# Patient Record
Sex: FEMALE | Race: BLACK OR AFRICAN AMERICAN | NOT HISPANIC OR LATINO | Employment: PART TIME | URBAN - METROPOLITAN AREA
[De-identification: names, ages, dates, MRNs, and addresses within clinical notes are randomized per-mention and may not be internally consistent; named-entity substitution may affect disease eponyms.]

---

## 2018-06-13 ENCOUNTER — TELEPHONE (OUTPATIENT)
Dept: VASCULAR SURGERY | Facility: CLINIC | Age: 68
End: 2018-06-13

## 2018-06-13 DIAGNOSIS — R60.0 LOCALIZED EDEMA: Primary | ICD-10-CM

## 2018-06-13 NOTE — TELEPHONE ENCOUNTER
----- Message from Kaveh Garza sent at 6/13/2018 12:57 PM CDT -----  Calling for a new patient appointment preferably [place], but can come to [place]. Patient was in hospital for ruptured varicose vein. Her appointment should be within two weeks. Please call [phone] (home) [phone] (work)  thanks

## 2018-06-18 NOTE — TELEPHONE ENCOUNTER
Per ED visit @ [facility] on 6/12/18, ruptured varicose vein, follow up with Dr Costa.  Informed of Dr Costa's ultrasound guidelines for venous symptoms.  Ultrasound of deep and superficial veins BLE ordered and scheduled per WOGS, and consultation also scheduled with Dr Costa.     Voices understanding, will call back as needed.

## 2018-07-02 ENCOUNTER — HOSPITAL ENCOUNTER (OUTPATIENT)
Dept: RADIOLOGY | Facility: HOSPITAL | Age: 68
Discharge: HOME | End: 2018-07-02
Attending: THORACIC SURGERY (CARDIOTHORACIC VASCULAR SURGERY)
Payer: COMMERCIAL

## 2018-07-02 DIAGNOSIS — R60.0 LOCALIZED EDEMA: ICD-10-CM

## 2018-07-02 PROCEDURE — 93970 EXTREMITY STUDY: CPT | Mod: 26,,, | Performed by: RADIOLOGY

## 2018-07-02 PROCEDURE — 93970 EXTREMITY STUDY: CPT | Mod: TC,PO

## 2018-07-05 ENCOUNTER — OFFICE VISIT (OUTPATIENT)
Dept: VASCULAR SURGERY | Facility: CLINIC | Age: 68
End: 2018-07-05
Payer: COMMERCIAL

## 2018-07-05 VITALS
WEIGHT: 189.63 LBS | BODY MASS INDEX: 38.23 KG/M2 | HEART RATE: 71 BPM | SYSTOLIC BLOOD PRESSURE: 131 MMHG | HEIGHT: 59 IN | DIASTOLIC BLOOD PRESSURE: 73 MMHG

## 2018-07-05 DIAGNOSIS — I87.2 VENOUS INSUFFICIENCY OF BOTH LOWER EXTREMITIES: Primary | ICD-10-CM

## 2018-07-05 PROCEDURE — 3008F BODY MASS INDEX DOCD: CPT | Mod: CPTII,S$GLB,, | Performed by: THORACIC SURGERY (CARDIOTHORACIC VASCULAR SURGERY)

## 2018-07-05 PROCEDURE — 99205 OFFICE O/P NEW HI 60 MIN: CPT | Mod: S$GLB,,, | Performed by: THORACIC SURGERY (CARDIOTHORACIC VASCULAR SURGERY)

## 2018-07-05 PROCEDURE — 99999 PR PBB SHADOW E&M-EST. PATIENT-LVL III: CPT | Mod: PBBFAC,,, | Performed by: THORACIC SURGERY (CARDIOTHORACIC VASCULAR SURGERY)

## 2018-07-05 RX ORDER — LISINOPRIL AND HYDROCHLOROTHIAZIDE 10; 12.5 MG/1; MG/1
1 TABLET ORAL
COMMUNITY
Start: 2018-01-29

## 2018-07-05 RX ORDER — CYCLOBENZAPRINE HCL 10 MG
5-10 TABLET ORAL
COMMUNITY
Start: 2018-01-29 | End: 2018-09-27

## 2018-07-05 RX ORDER — ALBUTEROL SULFATE 90 UG/1
1-2 AEROSOL, METERED RESPIRATORY (INHALATION)
COMMUNITY
Start: 2018-01-29

## 2018-07-05 RX ORDER — METFORMIN HYDROCHLORIDE 500 MG/1
500 TABLET, EXTENDED RELEASE ORAL
COMMUNITY
Start: 2018-04-30 | End: 2019-04-30

## 2018-07-05 RX ORDER — ROSUVASTATIN CALCIUM 20 MG/1
20 TABLET, COATED ORAL
COMMUNITY
Start: 2018-01-29

## 2018-07-05 RX ORDER — POTASSIUM CHLORIDE 750 MG/1
10 TABLET, EXTENDED RELEASE ORAL
COMMUNITY
Start: 2018-01-29

## 2018-07-05 NOTE — LETTER
July 5, 2018      [facility]  [address] [place] [address] Dr [place] [id number]           [facility]  [address] [facility]  [place] [address]  Phone: [phone]          Patient: Fabi Siddiqui   MR Number: [id number]   YOB: 1950   Date of Visit: 7/5/2018       Dear [facility]:    Thank you for referring Fabi Siddiqui to me for evaluation. Attached you will find relevant portions of my assessment and plan of care.    If you have questions, please do not hesitate to call me. I look forward to following Fabi Siddiqui along with you.    Sincerely,    Shanae Costa MD    Enclosure  CC:  No Recipients    If you would like to receive this communication electronically, please contact [email or web address] or [phone] to request more information on [facility] access.    For providers and/or their staff who would like to refer a patient to [facility], please contact us through our one-stop-shop provider referral line, [facility], at [phone].    If you feel you have received this communication in error or would no longer like to receive these types of communications, please e-mail [email or web address]

## 2018-07-05 NOTE — PROGRESS NOTES
HISTORY OF PRESENT ILLNESS:  The patient is a 67-year-old female who went to the   Emergency Room on 06/12/2018 because of a ruptured varicose vein in the right   ankle that bled profusely.  She had a near syncopal episode and she became very   weak and dizzy nearly passed out.  She was seen by Emergency Medical personnel   who applied pressure to the bleeding varicose vein and brought her to the   Emergency Room.  This was the third episode of bleeding from a varicose vein of   the right lower extremity in the last 4 to 6 weeks.  The patient has been   wearing compression stockings.    PAST MEDICAL HISTORY:  Diabetes mellitus and hypertension.    PAST SURGICAL HISTORY:  Myomectomy.    ALLERGIES:  No known drug allergies.    MEDICATIONS:  Albuterol, Flexeril, Prinzide, Glucophage, Klor-Con, Crestor.    FAMILY HISTORY:  Cancer, hypertension, coronary artery disease.    SOCIAL HISTORY:  She used to smoke cigarettes, but quit smoking in 2011.  Denies   alcohol use.    REVISION OF SYSTEMS:  She complains of bleeding varicose veins of the right   lower extremity.  She has had varicose veins for many years.  She has some   smaller varicose veins in the left lower extremity.  She states that she gets   very little edema, but the right leg experiences pain, which is worse towards   the end of the day.  The left leg has much less pain than the right lower   extremity.  All other systems are reviewed and are negative.    PHYSICAL EXAMINATION:  VITAL SIGNS:  Blood pressure is 131/73, respiratory rate is 18, heart rate is   71, weight 86 kg.  GENERAL:  She is awake and alert, in no apparent distress.  HEENT:  Head is normocephalic.  Pupils are equal, round, reactive.  Sclerae are   anicteric.  NECK:  Supple.  Trachea midline.  No masses.  LUNGS:  Clear.  HEART:  Has a regular rate and rhythm.  ABDOMEN:  Soft and nontender.  No masses.  Bowel sounds are present.  EXTREMITIES:  She has large varicose veins on bilateral lower  extremities, much   worse on the right than on the left.  There is one varicose vein with an eschar   overlying it on the medial aspect of the right ankle and foot.  There is a   smaller eschar on the anterolateral aspect of the right leg from where the   bleeding had occurred previously.  Bilateral feet are warm.  She has good   femoral pulses.  I cannot palpate dorsalis pedis nor posterior tibial pulses in   either lower extremity.  NEUROLOGIC:  Awake, alert and oriented.  No lateralizing neurologic deficits.    STUDIES:  Ultrasound of the veins of the lower extremities showed venous   insufficiency in the right femoral vein and popliteal veins with reflux times as   high as 4106 milliseconds.  There was venous insufficiency in an enlarged right   greater saphenous vein with a reflux times of 4989 milliseconds.  The right   lesser saphenous vein was also enlarged with a reflux time of 1878 milliseconds.    The left greater saphenous vein was also enlarged and insufficient and the   left lesser saphenous vein was also insufficient.  There was venous   insufficiency in the distal aspect of the left femoral vein only.    IMPRESSION:  1.  Bleeding varicose veins of the right lower extremity.  2.  Varicose veins of bilateral lower extremities.  3.  Venous insufficiency of bilateral greater saphenous veins.  4.  Venous insufficiency of bilateral lesser saphenous veins.  5.  Venous insufficiency of bilateral femoral veins.  6.  Venous insufficiency of the right popliteal vein.  7.  Diabetes mellitus.  8.  Hypertension.    RECOMMENDATIONS:  I think the patient would benefit from endovenous laser   ablation of the right greater and the right lesser saphenous veins followed by   Veinlite-guided sclerotherapy.  Risks and benefits were discussed.  The patient   has venous insufficiency of the left greater and the left lesser saphenous vein   and in the left femoral vein.  We will proceed with EVLT of the right greater   and  the right lesser saphenous vein followed by Veinlite-guided sclerotherapy   first and then we will make a decision regarding the left lower extremity.      CASEY  dd: 07/05/2018 08:44:38 (CDT)  td: 07/06/2018 04:40:01 (CDT)  Doc ID   #[id number]  Job ID #[id number]    CC:

## 2018-07-16 ENCOUNTER — TELEPHONE (OUTPATIENT)
Dept: VASCULAR SURGERY | Facility: CLINIC | Age: 68
End: 2018-07-16

## 2018-07-16 DIAGNOSIS — I87.2 PERIPHERAL VENOUS INSUFFICIENCY: Primary | ICD-10-CM

## 2018-07-16 NOTE — TELEPHONE ENCOUNTER
----- Message from Wanda Dale sent at 7/16/2018  8:54 AM CDT -----  Contact: Patient  Patient would like to know if will proceed with surgery?  Please call and advise @ [phone].  [facility] [place]/[facility]

## 2018-07-16 NOTE — TELEPHONE ENCOUNTER
Spoke to patient, EVLT RT GSV scheduled on 8/29/18.  Pre and post instructions reviewed, voices understanding.  Rx for compression hose and copy of instructions mailed to home address.

## 2018-08-22 ENCOUNTER — TELEPHONE (OUTPATIENT)
Dept: VASCULAR SURGERY | Facility: CLINIC | Age: 68
End: 2018-08-22

## 2018-08-22 NOTE — TELEPHONE ENCOUNTER
----- Message from Tacos Rodriguez sent at 8/22/2018  3:04 PM CDT -----  Contact: Patient  Type:  Patient Returning Call    Who Called:  Fabi  Who Left Message for Patient:  Not sure  Does the patient know what this is regarding?:  no  Best Call Back Number:  [phone]  Additional Information:  Believe's it could have been regarding her surgery scheduled for next week

## 2018-09-11 RX ORDER — ASPIRIN 325 MG
325 TABLET ORAL DAILY
COMMUNITY

## 2018-09-12 ENCOUNTER — HOSPITAL ENCOUNTER (OUTPATIENT)
Facility: HOSPITAL | Age: 68
Discharge: HOME | End: 2018-09-12
Attending: THORACIC SURGERY (CARDIOTHORACIC VASCULAR SURGERY) | Admitting: THORACIC SURGERY (CARDIOTHORACIC VASCULAR SURGERY)
Payer: COMMERCIAL

## 2018-09-12 VITALS
SYSTOLIC BLOOD PRESSURE: 140 MMHG | BODY MASS INDEX: 38.71 KG/M2 | HEART RATE: 80 BPM | TEMPERATURE: 98 F | RESPIRATION RATE: 18 BRPM | DIASTOLIC BLOOD PRESSURE: 74 MMHG | WEIGHT: 192 LBS | HEIGHT: 59 IN | OXYGEN SATURATION: 98 %

## 2018-09-12 DIAGNOSIS — I87.2 VENOUS INSUFFICIENCY OF BOTH LOWER EXTREMITIES: Primary | ICD-10-CM

## 2018-09-12 PROCEDURE — 36000707: Mod: PO | Performed by: THORACIC SURGERY (CARDIOTHORACIC VASCULAR SURGERY)

## 2018-09-12 PROCEDURE — 25000003 PHARM REV CODE 250: Mod: PO | Performed by: THORACIC SURGERY (CARDIOTHORACIC VASCULAR SURGERY)

## 2018-09-12 PROCEDURE — 99152 MOD SED SAME PHYS/QHP 5/>YRS: CPT | Mod: ,,, | Performed by: THORACIC SURGERY (CARDIOTHORACIC VASCULAR SURGERY)

## 2018-09-12 PROCEDURE — 36478 ENDOVENOUS LASER 1ST VEIN: CPT | Mod: RT,,, | Performed by: THORACIC SURGERY (CARDIOTHORACIC VASCULAR SURGERY)

## 2018-09-12 PROCEDURE — 71000015 HC POSTOP RECOV 1ST HR: Mod: PO | Performed by: THORACIC SURGERY (CARDIOTHORACIC VASCULAR SURGERY)

## 2018-09-12 PROCEDURE — 63600175 PHARM REV CODE 636 W HCPCS: Mod: PO | Performed by: THORACIC SURGERY (CARDIOTHORACIC VASCULAR SURGERY)

## 2018-09-12 PROCEDURE — 36000706: Mod: PO | Performed by: THORACIC SURGERY (CARDIOTHORACIC VASCULAR SURGERY)

## 2018-09-12 PROCEDURE — 82962 GLUCOSE BLOOD TEST: CPT | Mod: PO | Performed by: THORACIC SURGERY (CARDIOTHORACIC VASCULAR SURGERY)

## 2018-09-12 RX ORDER — FENTANYL CITRATE 50 UG/ML
INJECTION, SOLUTION INTRAMUSCULAR; INTRAVENOUS
Status: DISCONTINUED | OUTPATIENT
Start: 2018-09-12 | End: 2018-09-12 | Stop reason: HOSPADM

## 2018-09-12 RX ORDER — LIDOCAINE HYDROCHLORIDE 10 MG/ML
1 INJECTION, SOLUTION EPIDURAL; INFILTRATION; INTRACAUDAL; PERINEURAL ONCE
Status: DISCONTINUED | OUTPATIENT
Start: 2018-09-12 | End: 2018-09-12 | Stop reason: HOSPADM

## 2018-09-12 RX ORDER — MIDAZOLAM HYDROCHLORIDE 1 MG/ML
4 INJECTION INTRAMUSCULAR; INTRAVENOUS ONCE
Status: DISCONTINUED | OUTPATIENT
Start: 2018-09-12 | End: 2018-09-12 | Stop reason: HOSPADM

## 2018-09-12 RX ORDER — HYDROCODONE BITARTRATE AND ACETAMINOPHEN 5; 325 MG/1; MG/1
1 TABLET ORAL EVERY 6 HOURS PRN
Qty: 30 TABLET | Refills: 0 | Status: SHIPPED | OUTPATIENT
Start: 2018-09-12 | End: 2018-09-27

## 2018-09-12 RX ORDER — FENTANYL CITRATE 50 UG/ML
50 INJECTION, SOLUTION INTRAMUSCULAR; INTRAVENOUS ONCE
Status: DISCONTINUED | OUTPATIENT
Start: 2018-09-12 | End: 2018-09-12 | Stop reason: HOSPADM

## 2018-09-12 RX ORDER — LIDOCAINE HYDROCHLORIDE AND EPINEPHRINE 10; 10 MG/ML; UG/ML
INJECTION, SOLUTION INFILTRATION; PERINEURAL
Status: DISCONTINUED | OUTPATIENT
Start: 2018-09-12 | End: 2018-09-12 | Stop reason: HOSPADM

## 2018-09-12 RX ORDER — MIDAZOLAM HYDROCHLORIDE 1 MG/ML
INJECTION INTRAMUSCULAR; INTRAVENOUS
Status: DISCONTINUED | OUTPATIENT
Start: 2018-09-12 | End: 2018-09-12 | Stop reason: HOSPADM

## 2018-09-12 RX ORDER — LIDOCAINE HYDROCHLORIDE 10 MG/ML
INJECTION, SOLUTION EPIDURAL; INFILTRATION; INTRACAUDAL; PERINEURAL
Status: DISCONTINUED | OUTPATIENT
Start: 2018-09-12 | End: 2018-09-12 | Stop reason: HOSPADM

## 2018-09-12 RX ORDER — SODIUM CHLORIDE, SODIUM LACTATE, POTASSIUM CHLORIDE, CALCIUM CHLORIDE 600; 310; 30; 20 MG/100ML; MG/100ML; MG/100ML; MG/100ML
INJECTION, SOLUTION INTRAVENOUS CONTINUOUS
Status: DISCONTINUED | OUTPATIENT
Start: 2018-09-12 | End: 2018-09-12 | Stop reason: HOSPADM

## 2018-09-12 NOTE — H&P
HISTORY OF PRESENT ILLNESS:  The patient is a 67-year-old female who went to the   Emergency Room on 06/12/2018 because of a ruptured varicose vein in the right   ankle that bled profusely.  She had a near syncopal episode and she became very   weak and dizzy nearly passed out.  She was seen by Emergency Medical personnel   who applied pressure to the bleeding varicose vein and brought her to the   Emergency Room.  This was the third episode of bleeding from a varicose vein of   the right lower extremity in the last 4 to 6 weeks.  The patient has been   wearing compression stockings.     PAST MEDICAL HISTORY:  Diabetes mellitus and hypertension.     PAST SURGICAL HISTORY:  Myomectomy.     ALLERGIES:  No known drug allergies.     MEDICATIONS:  Albuterol, Flexeril, Prinzide, Glucophage, Klor-Con, Crestor.     FAMILY HISTORY:  Cancer, hypertension, coronary artery disease.     SOCIAL HISTORY:  She used to smoke cigarettes, but quit smoking in 2011.  Denies   alcohol use.     REVISION OF SYSTEMS:  She complains of bleeding varicose veins of the right   lower extremity.  She has had varicose veins for many years.  She has some   smaller varicose veins in the left lower extremity.  She states that she gets   very little edema, but the right leg experiences pain, which is worse towards   the end of the day.  The left leg has much less pain than the right lower   extremity.  All other systems are reviewed and are negative.     PHYSICAL EXAMINATION:  VITAL SIGNS:  Blood pressure is 131/73, respiratory rate is 18, heart rate is   71, weight 86 kg.  GENERAL:  She is awake and alert, in no apparent distress.  HEENT:  Head is normocephalic.  Pupils are equal, round, reactive.  Sclerae are   anicteric.  NECK:  Supple.  Trachea midline.  No masses.  LUNGS:  Clear.  HEART:  Has a regular rate and rhythm.  ABDOMEN:  Soft and nontender.  No masses.  Bowel sounds are present.  EXTREMITIES:  She has large varicose veins on bilateral  lower extremities, much   worse on the right than on the left.  There is one varicose vein with an eschar   overlying it on the medial aspect of the right ankle and foot.  There is a   smaller eschar on the anterolateral aspect of the right leg from where the   bleeding had occurred previously.  Bilateral feet are warm.  She has good   femoral pulses.  I cannot palpate dorsalis pedis nor posterior tibial pulses in   either lower extremity.  NEUROLOGIC:  Awake, alert and oriented.  No lateralizing neurologic deficits.     STUDIES:  Ultrasound of the veins of the lower extremities showed venous   insufficiency in the right femoral vein and popliteal veins with reflux times as   high as 4106 milliseconds.  There was venous insufficiency in an enlarged right   greater saphenous vein with a reflux times of 4989 milliseconds.  The right   lesser saphenous vein was also enlarged with a reflux time of 1878 milliseconds.    The left greater saphenous vein was also enlarged and insufficient and the   left lesser saphenous vein was also insufficient.  There was venous   insufficiency in the distal aspect of the left femoral vein only.     IMPRESSION:  1.  Bleeding varicose veins of the right lower extremity.  2.  Varicose veins of bilateral lower extremities.  3.  Venous insufficiency of bilateral greater saphenous veins.  4.  Venous insufficiency of bilateral lesser saphenous veins.  5.  Venous insufficiency of bilateral femoral veins.  6.  Venous insufficiency of the right popliteal vein.  7.  Diabetes mellitus.  8.  Hypertension.     RECOMMENDATIONS:  I think the patient would benefit from endovenous laser   ablation of the right greater and the right lesser saphenous veins followed by   Veinlite-guided sclerotherapy.  Risks and benefits were discussed.  The patient   has venous insufficiency of the left greater and the left lesser saphenous vein   and in the left femoral vein.  We will proceed with EVLT of the right  greater   saphenous vein today.

## 2018-09-12 NOTE — BRIEF OP NOTE
[facility]  Brief Operative Note     SUMMARY     Surgery Date: 9/12/2018     Surgeon(s) and Role:     * Shanae Costa MD - Primary    Assisting Surgeon: VASILIY Vasquez    Pre-op Diagnosis:  Peripheral venous insufficiency [I87.2]    Post-op Diagnosis:  Post-Op Diagnosis Codes:     * Peripheral venous insufficiency [I87.2]    Procedure(s) (LRB):  ABLATION, PERCUTANEOUS, PERIPHERAL VEIN, ENDOVENOUS, USING LASER- RIGHT GREATER SAPHENOUS VEIN (Right)    Anesthesia: RN IV Sedation    Description of the findings of the procedure: Insufficient vein    Findings/Key Components: As above    Estimated Blood Loss: 5cc       Specimens:   Specimen (12h ago, onward)    None          Discharge Note    SUMMARY     Admit Date: 9/12/2018    Discharge Date and Time:  09/12/2018 3:22 PM    Hospital Course (synopsis of major diagnoses, care, treatment, and services provided during the course of the hospital stay): Pt with venous insufficiency of the R GSV and R LSV with bleeding from varicose veins. She underwent EVLT of the R GSV and did well.    Final Diagnosis: Post-Op Diagnosis Codes:     * Peripheral venous insufficiency [I87.2]    Disposition: Home or Self Care    Follow Up/Patient Instructions: 2 weeks    Medications:  Reconciled Home Medications:      Medication List      START taking these medications    HYDROcodone-acetaminophen 5-325 mg per tablet  Commonly known as:  NORCO  Take 1 tablet by mouth every 6 (six) hours as needed.        CONTINUE taking these medications    albuterol 90 mcg/actuation inhaler  Inhale 1-2 puffs into the lungs.     aspirin 325 MG tablet  Take 325 mg by mouth once daily.     cyclobenzaprine 10 MG tablet  Commonly known as:  FLEXERIL  Take 5-10 mg by mouth.     lisinopril-hydrochlorothiazide 10-12.5 mg per tablet  Commonly known as:  PRINZIDE,ZESTORETIC  Take 1 tablet by mouth.     metFORMIN 500 MG 24 hr tablet  Commonly known as:  GLUCOPHAGE-XR  Take 500 mg by mouth.     potassium  chloride 10 MEQ Tbsr  Commonly known as:  KLOR-CON  Take 10 mEq by mouth.     rosuvastatin 20 MG tablet  Commonly known as:  CRESTOR  Take 20 mg by mouth.          Discharge Procedure Orders   Diet Cardiac     Remove dressing in 48 hours     Shower on day dressing removed (No bath)     Follow-up Information     Khoi Costa MD In 2 weeks.    Specialties:  Cardiothoracic Surgery, Vascular Surgery, Cardiovascular Disease, Cardiology  Contact information:  [address] [place] [address] [phone]

## 2018-09-12 NOTE — DISCHARGE INSTRUCTIONS
Recovery After Procedural Sedation (Adult)  You have been given medicine by vein to make you sleep during your surgery. This may have included both a pain medicine and sleeping medicine. Most of the effects have worn off. But you may still have some drowsiness for the next 6 to 8 hours.  Home care  Follow these guidelines when you get home:  · For the next 8 hours, you should be watched by a responsible adult. This person should make sure your condition is not getting worse.  · Don't drink any alcohol for the next 24 hours.  · Don't drive, operate dangerous machinery, or make important business or personal decisions during the next 24 hours.  Note: Your healthcare provider may tell you not to take any medicine by mouth for pain or sleep in the next 4 hours. These medicines may react with the medicines you were given in the hospital. This could cause a much stronger response than usual.  Follow-up care  Follow up with your healthcare provider if you are not alert and back to your usual level of activity within 12 hours.  When to seek medical advice  Call your healthcare provider right away if any of these occur:  · Drowsiness gets worse  · Weakness or dizziness gets worse  · Repeated vomiting  · You can't be awakened   Date Last Reviewed: 10/18/2016  © 2301-9233 [employer]. [address] [place] [address], [place] [address]. All rights reserved. This information is not intended as a substitute for professional medical care. Always follow your healthcare professional's instructions.        GALLOWAYS VEIN ABLATION:     No driving or operating heavy equipment on the day of the procedure   Avoid alcohol or making important decisions for 24 hours.    Keep the operation site dry for two days and maintain your ace wrap. If it feels too tight please use your body as a guide and loosen wrap accordingly.   Sponge baths until dressing removed at 48 hours then you may shower.   begin wearing your compression hose  during the day for the next 2 weeks the morning following 48 hour    Leave steri strips in place if present.  If steri-strips get wet, pat dry. If they fall off, do not worry.   Expect some discomfort for the first week after procedure ( pulling sensation along your thigh or calf is normal and will subside, some bruising can be expected   Regular diet   May take 400-600mg of Ibuprophen three times day to minimize inflammation if not allergic   Bruising is to be expected.   Keep leg elevated when not ambulating   Walking is encouraged avoid prolonged sitting or standing for the first week. Start on day one.   Resume home medications as prescribed.   Wear support hose starting the morning of day three after surgery as directed per Dr. Parkinson   Avoid squatting, heavy lifting, or aerobic activity until follow up   Wear compression stockings if traveling longer then one hour in car or plane for next six months and stand/walk at least every hour and a half   Avoid blood thinning medications for four days after procedure if approved by prescribing doctor      DONT:   Do not shower or tub bathe until after dressing removed.   No driving for 24 hours or while taking narcotic pain medication.   DO NOT TAKE ADDITIONAL TYLENOL/ACETAMINOPHEN WHILE TAKING NARCOTIC PAIN MEDICINE THAT CONTAINS TYLENOL/ACETAMINOPHEN.    CALL PHYSICIAN FOR:   Redness, swelling, or bleeding from surgical site   Fever greater then 101...   Drainage from the incision site that appears infected.   Persistent pain not relieved by pain medication.    RETURN APPOINTMENT: Call to schedule appointment in two weeks  FOR EMERGENCIES: Contact Dr. Costa at [phone]

## 2018-09-13 NOTE — OP NOTE
DATE OF PROCEDURE:  09/12/2018    PREOPERATIVE DIAGNOSES:  1.  Venous insufficiency of the right greater saphenous vein.  2.  Venous insufficiency of the right lesser saphenous vein.  3.  Bleeding varicose veins of the right lower extremity.    POSTOPERATIVE DIAGNOSES:  1.  Venous insufficiency of the right greater saphenous vein.  2.  Venous insufficiency of the right lesser saphenous vein.  3.  Bleeding varicose veins of the right lower extremity.    OPERATION:  Endovenous laser ablation of the right greater saphenous vein.    SURGEON:  Khoi Costa M.D., F.A.C.S.    ASSISTANT:  RICO Palma.    ANESTHESIA:  1.  Local tumescent anesthesia using a mixture of 50 mL of 1% lidocaine with   epinephrine and 500 mL of normal saline.  2.  Intravenous sedation using 4 mg of Versed and 50 mcg of fentanyl IV.  The   patient's level of consciousness was monitored by the registered nurse from 2:27   to 3:18 p.m.  Other monitors included blood pressure, pulse oximetry, heart   rate, and respiratory rate.    PROCEDURE IN DETAIL:  With the patient in the supine position on the operating   room table, the right lower extremity was prepped and draped in a sterile   fashion.  IV sedation was given in incremental doses for a total as described   above and the patient was monitored by the registered nurse as described.  Using   ultrasound guidance and an 18-gauge needle, I gained access to the right   greater saphenous vein just below the knee.  A guidewire was passed through this   and then a 65 cm long sheath was passed over the guidewire and the guidewire   and dilator were removed and the sheath was aspirated and flushed.  The laser   fiber was passed through the sheath and the tip of the fiber was positioned   distal to the saphenofemoral junction and the takeoff of the superior epigastric   vein.  Local tumescent anesthesia was infiltrated into the josue-saphenous   tissues and then the Dornier 940 nm laser was activated in  the pulse mode with   the duration of each pulse set at 1.3 seconds per pulse and the laser was pulled   back at approximately 1 mm per second until the entire length of cannulated   vein was treated and ablated.  Compression dressing was applied to the lower   extremity.  The patient tolerated the procedure and left the Operating Room in   satisfactory and stable condition.      CASEY  dd: 09/12/2018 18:19:46 (CDT)  td: 09/12/2018 21:15:56 (CDT)  Doc ID   #[id number]  Job ID #[id number]    CC:

## 2018-09-14 ENCOUNTER — TELEPHONE (OUTPATIENT)
Dept: VASCULAR SURGERY | Facility: CLINIC | Age: 68
End: 2018-09-14

## 2018-09-14 NOTE — TELEPHONE ENCOUNTER
Pt was only taking 1 Norco, advised that she can take up to 2 every 6 hours along with adding a NSAIDS for any breakthrough pain. Pt verbalized understanding.

## 2018-09-14 NOTE — TELEPHONE ENCOUNTER
----- Message from Paloma Johnson RT sent at 9/14/2018  9:48 AM CDT -----  Contact: pt    pt , requesting to inform she needs a stronger medication for her leg pain issues, thanks.

## 2018-09-27 ENCOUNTER — OFFICE VISIT (OUTPATIENT)
Dept: VASCULAR SURGERY | Facility: CLINIC | Age: 68
End: 2018-09-27
Payer: COMMERCIAL

## 2018-09-27 VITALS
WEIGHT: 188.5 LBS | SYSTOLIC BLOOD PRESSURE: 146 MMHG | DIASTOLIC BLOOD PRESSURE: 70 MMHG | HEART RATE: 74 BPM | HEIGHT: 59 IN | BODY MASS INDEX: 38 KG/M2

## 2018-09-27 DIAGNOSIS — I87.2 VENOUS INSUFFICIENCY OF RIGHT LOWER EXTREMITY: Primary | ICD-10-CM

## 2018-09-27 PROCEDURE — 93971 EXTREMITY STUDY: CPT | Mod: PBBFAC,PO | Performed by: THORACIC SURGERY (CARDIOTHORACIC VASCULAR SURGERY)

## 2018-09-27 PROCEDURE — 99213 OFFICE O/P EST LOW 20 MIN: CPT | Mod: PBBFAC,PO | Performed by: THORACIC SURGERY (CARDIOTHORACIC VASCULAR SURGERY)

## 2018-09-27 PROCEDURE — 99213 OFFICE O/P EST LOW 20 MIN: CPT | Mod: 25,S$GLB,, | Performed by: THORACIC SURGERY (CARDIOTHORACIC VASCULAR SURGERY)

## 2018-09-27 PROCEDURE — 99999 PR PBB SHADOW E&M-EST. PATIENT-LVL III: CPT | Mod: PBBFAC,,, | Performed by: THORACIC SURGERY (CARDIOTHORACIC VASCULAR SURGERY)

## 2018-09-27 PROCEDURE — 93971 EXTREMITY STUDY: CPT | Mod: S$GLB,,, | Performed by: THORACIC SURGERY (CARDIOTHORACIC VASCULAR SURGERY)

## 2018-09-27 RX ORDER — HYDROCHLOROTHIAZIDE 25 MG/1
12.5 TABLET ORAL
COMMUNITY
Start: 2015-03-02

## 2018-09-27 NOTE — PROGRESS NOTES
OFFICE NOTE    Ms. Siddiqui underwent endovenous laser ablation of the right greater saphenous vein.    She had bleeding varicose veins with venous insufficiency of the right greater   and lesser saphenous veins.  On physical examination, the access site to the   right greater saphenous vein is well healed.  She has varicose veins of the   right lower extremity.  Feet are well perfused.  I did an ultrasound of the   right greater saphenous vein and the vein was noncompressible and had no flow   from the knee up to the groin, indicating successful endovenous laser ablation   of the right greater saphenous vein.  We will set her up for EVLT of the right   lesser saphenous vein, but the patient states that she wants to wait.  She will   let us know when she is ready to proceed with right lesser saphenous vein   ablation.      CASEY  dd: 09/27/2018 14:28:25 (CDT)  td: 09/28/2018 02:50:08 (CDT)  Doc ID   #[id number]  Job ID #[id number]    CC:

## 2018-10-10 ENCOUNTER — TELEPHONE (OUTPATIENT)
Dept: VASCULAR SURGERY | Facility: CLINIC | Age: 68
End: 2018-10-10

## 2018-10-10 NOTE — TELEPHONE ENCOUNTER
----- Message from Koffi Shah sent at 10/10/2018 11:36 AM CDT -----  Contact: Claribel  Type: Needs Medical Advice    Who Called:  Claribel patient's daughter  Symptoms (please be specific):    How long has patient had these symptoms:    Pharmacy name and phone #:    Best Call Back Number: [phone]  Additional Information: called to verify if FMLA, paperwork has been completed? Call back to advise stated patient is in the area

## (undated) DEVICE — SEE MEDLINE ITEM 146373

## (undated) DEVICE — LASER FIBER 600 MICRON

## (undated) DEVICE — SEE MEDLINE ITEM 152530

## (undated) DEVICE — Device

## (undated) DEVICE — PAD ABD 8X10 STERILE

## (undated) DEVICE — PACK ENDOVENOUS CUSTOM

## (undated) DEVICE — APPLICATOR CHLORAPREP ORN 26ML

## (undated) DEVICE — UNDERGLOVE BIOGEL PI SZ 6.5 LF

## (undated) DEVICE — SEE MEDLINE ITEM 146271

## (undated) DEVICE — SEE MEDLINE ITEM 146231

## (undated) DEVICE — TUBING INFILTRATION SNG SPIKE

## (undated) DEVICE — SPONGE DERMACEA GAUZE 4X4

## (undated) DEVICE — UNDERGLOVES BIOGEL PI SZ 7 LF

## (undated) DEVICE — SEE MEDLINE ITEM 146362